# Patient Record
Sex: FEMALE | Race: WHITE | URBAN - METROPOLITAN AREA
[De-identification: names, ages, dates, MRNs, and addresses within clinical notes are randomized per-mention and may not be internally consistent; named-entity substitution may affect disease eponyms.]

---

## 2017-01-05 ENCOUNTER — NURSE ONLY (OUTPATIENT)
Dept: ALLERGY | Facility: CLINIC | Age: 22
End: 2017-01-05

## 2017-01-05 DIAGNOSIS — J30.89 ENVIRONMENTAL AND SEASONAL ALLERGIES: Primary | ICD-10-CM

## 2017-01-05 PROCEDURE — 95117 IMMUNOTHERAPY INJECTIONS: CPT | Performed by: INTERNAL MEDICINE

## 2017-01-05 PROCEDURE — 95165 ANTIGEN THERAPY SERVICES: CPT | Performed by: INTERNAL MEDICINE

## 2017-04-18 NOTE — TELEPHONE ENCOUNTER
Patient is requesting refill request for the following medication - please advise. Norethin Ace-Eth Estrad-FE 1-20 MG-MCG Oral Tab Take 1 tablet by mouth daily.  Disp: 1 Package Rfl: 5

## 2017-04-19 NOTE — TELEPHONE ENCOUNTER
Gynecology Medications: Refill protocol failed because the patient did not meet the protocol criteria.  Please advise in regards to refill request     Protocol Criteria:  · Appointment scheduled in the past 12 months or the next 3 months  · Pap smear in the

## 2017-04-21 RX ORDER — NORETHINDRONE ACETATE AND ETHINYL ESTRADIOL 1MG-20(21)
1 KIT ORAL DAILY
Qty: 1 PACKAGE | Refills: 5 | Status: SHIPPED | OUTPATIENT
Start: 2017-04-21

## 2017-05-31 ENCOUNTER — TELEPHONE (OUTPATIENT)
Dept: ALLERGY | Facility: CLINIC | Age: 22
End: 2017-05-31

## 2017-05-31 NOTE — TELEPHONE ENCOUNTER
Karie from [facility] [place] [facility] and stts that the pt was just seen in office today for allergy shots. The pt has an appt to see  on July 17th.   Yet one of the pts timmy will be expiring on June 8th, yet there is enough to give

## 2017-05-31 NOTE — TELEPHONE ENCOUNTER
Call reviewed and noted.   Okay to use the expiring allergy serum vial in June 2017 until patient can be seen by me  in July

## 2017-05-31 NOTE — TELEPHONE ENCOUNTER
J Luis Almendarez MA from [facility] called back and informed per Dr. Nadege bass to use vials until end on June 2017. Pt. Be seen July 17, 2017. No further action required at this time.

## 2017-05-31 NOTE — TELEPHONE ENCOUNTER
LMTCB informing that call back needed as received call in regards to pts allergen vials. Will await call back.

## 2017-07-10 ENCOUNTER — TELEPHONE (OUTPATIENT)
Dept: ALLERGY | Facility: CLINIC | Age: 22
End: 2017-07-10

## 2017-07-10 NOTE — TELEPHONE ENCOUNTER
Mother stating that pt allergist that she sees while away at school, will be faxing over pt most recent shot record. OPRZS:[phone] (Pt allergist) Mother couldn't remember the name.

## 2017-07-12 NOTE — TELEPHONE ENCOUNTER
Received recent allergy injection shot record via fax on 7/11/17. Record placed in chart. Patient has a f/u appointment on 7/17/17.

## 2017-07-13 ENCOUNTER — TELEPHONE (OUTPATIENT)
Dept: ALLERGY | Facility: CLINIC | Age: 22
End: 2017-07-13

## 2017-07-13 NOTE — TELEPHONE ENCOUNTER
Left message to tell pt that fax from school arrived and that we will discuss needs for school at her appointment on Monday 7/17/2017. Left number for any questions.

## 2017-07-15 NOTE — TELEPHONE ENCOUNTER
I spoke with Bethany Askew RN. She will plan to come in early and mix vials on Monday morning. I called and spoke with patient's mother to notify that we will be able to prepare her vials and have ready for her 7/17/17 f/u appt with Dr. Magnolia Lee.   No further q

## 2017-07-15 NOTE — TELEPHONE ENCOUNTER
Spoke with patient's mother Eugenia Fonseca), would like to verify Imtiaz Higgins may  college vials on Monday, 7/17/17. If unable to get vials, patient will need to d/c AIT program.  She goes to school in [facility].   Fax from  [place] received

## 2017-07-15 NOTE — TELEPHONE ENCOUNTER
Call reviewed and noted. We will have to check with Miranda Champion on Monday to see if she has any vials  prepared for patient. If not we typically require 2 weeks notice to prepare.   If this is not reasonable for patient and we may have to consider discontinuati

## 2017-07-17 ENCOUNTER — OFFICE VISIT (OUTPATIENT)
Dept: ALLERGY | Facility: CLINIC | Age: 22
End: 2017-07-17

## 2017-07-17 ENCOUNTER — NURSE ONLY (OUTPATIENT)
Dept: ALLERGY | Facility: CLINIC | Age: 22
End: 2017-07-17

## 2017-07-17 VITALS
HEIGHT: 66 IN | OXYGEN SATURATION: 97 % | WEIGHT: 135 LBS | RESPIRATION RATE: 18 BRPM | HEART RATE: 103 BPM | SYSTOLIC BLOOD PRESSURE: 116 MMHG | TEMPERATURE: 99 F | BODY MASS INDEX: 21.69 KG/M2 | DIASTOLIC BLOOD PRESSURE: 67 MMHG

## 2017-07-17 DIAGNOSIS — J30.1 CHRONIC SEASONAL ALLERGIC RHINITIS DUE TO POLLEN: Primary | ICD-10-CM

## 2017-07-17 DIAGNOSIS — Z91.011 MILK ALLERGY: ICD-10-CM

## 2017-07-17 DIAGNOSIS — J30.89 ENVIRONMENTAL AND SEASONAL ALLERGIES: ICD-10-CM

## 2017-07-17 PROCEDURE — 95117 IMMUNOTHERAPY INJECTIONS: CPT | Performed by: ALLERGY & IMMUNOLOGY

## 2017-07-17 PROCEDURE — 99214 OFFICE O/P EST MOD 30 MIN: CPT | Performed by: ALLERGY & IMMUNOLOGY

## 2017-07-17 PROCEDURE — 99212 OFFICE O/P EST SF 10 MIN: CPT | Performed by: ALLERGY & IMMUNOLOGY

## 2017-07-17 PROCEDURE — 95165 ANTIGEN THERAPY SERVICES: CPT | Performed by: ALLERGY & IMMUNOLOGY

## 2017-07-17 RX ORDER — LEVOTHYROXINE SODIUM 0.15 MG/1
TABLET ORAL
Refills: 1 | COMMUNITY
Start: 2017-07-05

## 2017-07-17 RX ORDER — NORETHINDRONE ACETATE/ETHINYL ESTRADIOL 1MG-20MCG
KIT ORAL
Refills: 0 | COMMUNITY
Start: 2017-06-05 | End: 2017-07-17

## 2017-07-17 NOTE — PROGRESS NOTES
Natalyakimberlymarleni Nolas is a 25year old female. HPI:   Patient presents with: Allergies: AIT      Patient is a 16-year-old female who presents for follow-up with a chief complaint of allergies.     Patient has a history of allergic rhinitis milk allerg Comment: No Household Smokers   Alcohol use: No                 Medications (Active prior to today's visit):    Current Outpatient Prescriptions:  Norethin Ace-Eth Estrad-FE 1-20 MG-MCG Oral Tab Take 1 tablet by mouth daily.  Disp: 1 Package Rfl: 5   Le clubbing     ASSESSMENT/PLAN:   Assessment   Chronic seasonal allergic rhinitis due to pollen  (primary encounter diagnosis)  Milk allergy    1 AR  Stable and controlled at this time with current AIT q 4 weeks to rw, weeds, mold     Patient has been on [place]

## 2017-07-17 NOTE — PROGRESS NOTES
Pt in for 's visit for AIT. Pt verified name and date of birth, picked up college vials and received first injection from them. Signed consent and was told to keep vials refrigerated.  Given injection record and information for missed doses and anaphylaxi

## 2018-03-15 ENCOUNTER — TELEPHONE (OUTPATIENT)
Dept: ALLERGY | Facility: CLINIC | Age: 23
End: 2018-03-15

## 2018-03-15 NOTE — TELEPHONE ENCOUNTER
Snow Watters from [facility] calling to inform Dr. Nithya Rodriguez that pt had her last allergy shot on 10/18/17 and they have  vials of medication. They have been trying to reach the pt unsuccessfully.  Snow Watters states faxing that she

## 2018-03-16 NOTE — TELEPHONE ENCOUNTER
Call reviewed and noted. Patient last seen by me in July 2017.   My note at last visit patient was to complete her current bouts of immunotherapy through me and then to seek an allergist locally in [facility] as upon graduation she was staying in the [facility] [place]

## 2018-03-16 NOTE — TELEPHONE ENCOUNTER
LMTCB informing pt that office is closed and to please call back as office will open again 9 am 3/19/18.

## 2018-03-19 NOTE — TELEPHONE ENCOUNTER
Megan Aragon of [facility] contacted and advised per Dr. Mary Thomson, \"note below patient was to complete her current bouts of immunotherapy through me and then to seek an allergist locally in [facility] as upon graduation she was stayin